# Patient Record
Sex: FEMALE | Race: WHITE
[De-identification: names, ages, dates, MRNs, and addresses within clinical notes are randomized per-mention and may not be internally consistent; named-entity substitution may affect disease eponyms.]

---

## 2017-09-28 ENCOUNTER — HOSPITAL ENCOUNTER (OUTPATIENT)
Dept: HOSPITAL 84 - D.MAMMO | Age: 47
End: 2017-09-28
Attending: FAMILY MEDICINE
Payer: MEDICAID

## 2017-09-28 DIAGNOSIS — N63: Primary | ICD-10-CM

## 2017-12-19 ENCOUNTER — HOSPITAL ENCOUNTER (EMERGENCY)
Dept: HOSPITAL 84 - D.ER | Age: 47
Discharge: HOME | End: 2017-12-19
Payer: MEDICAID

## 2017-12-19 DIAGNOSIS — K04.7: ICD-10-CM

## 2017-12-19 DIAGNOSIS — Z79.4: ICD-10-CM

## 2017-12-19 DIAGNOSIS — F17.200: ICD-10-CM

## 2017-12-19 DIAGNOSIS — K02.9: Primary | ICD-10-CM

## 2017-12-19 DIAGNOSIS — E11.9: ICD-10-CM

## 2018-03-28 ENCOUNTER — HOSPITAL ENCOUNTER (EMERGENCY)
Dept: HOSPITAL 84 - D.ER | Age: 48
Discharge: HOME | End: 2018-03-28
Payer: MEDICAID

## 2018-03-28 DIAGNOSIS — Z79.4: ICD-10-CM

## 2018-03-28 DIAGNOSIS — F17.200: ICD-10-CM

## 2018-03-28 DIAGNOSIS — H00.015: Primary | ICD-10-CM

## 2018-03-28 DIAGNOSIS — E11.9: ICD-10-CM

## 2018-03-28 DIAGNOSIS — K04.7: ICD-10-CM

## 2018-11-08 ENCOUNTER — HOSPITAL ENCOUNTER (EMERGENCY)
Dept: HOSPITAL 84 - D.ER | Age: 48
Discharge: HOME | End: 2018-11-08
Payer: SELF-PAY

## 2018-11-08 VITALS — SYSTOLIC BLOOD PRESSURE: 100 MMHG | DIASTOLIC BLOOD PRESSURE: 74 MMHG

## 2018-11-08 VITALS — BODY MASS INDEX: 18.52 KG/M2 | HEIGHT: 66 IN | WEIGHT: 115.24 LBS

## 2018-11-08 DIAGNOSIS — L03.111: ICD-10-CM

## 2018-11-08 DIAGNOSIS — L02.411: Primary | ICD-10-CM

## 2018-11-08 DIAGNOSIS — E11.9: ICD-10-CM

## 2018-11-08 DIAGNOSIS — K21.9: ICD-10-CM

## 2019-06-04 ENCOUNTER — HOSPITAL ENCOUNTER (INPATIENT)
Dept: HOSPITAL 84 - D.ER | Age: 49
LOS: 3 days | Discharge: HOME | DRG: 189 | End: 2019-06-07
Attending: INTERNAL MEDICINE | Admitting: INTERNAL MEDICINE
Payer: MEDICAID

## 2019-06-04 VITALS
BODY MASS INDEX: 18.04 KG/M2 | BODY MASS INDEX: 18.04 KG/M2 | BODY MASS INDEX: 18.04 KG/M2 | WEIGHT: 112.24 LBS | HEIGHT: 66 IN | WEIGHT: 112.24 LBS | HEIGHT: 66 IN

## 2019-06-04 VITALS — DIASTOLIC BLOOD PRESSURE: 63 MMHG | SYSTOLIC BLOOD PRESSURE: 103 MMHG

## 2019-06-04 VITALS — DIASTOLIC BLOOD PRESSURE: 76 MMHG | SYSTOLIC BLOOD PRESSURE: 97 MMHG

## 2019-06-04 DIAGNOSIS — J20.9: ICD-10-CM

## 2019-06-04 DIAGNOSIS — E87.1: ICD-10-CM

## 2019-06-04 DIAGNOSIS — J44.1: ICD-10-CM

## 2019-06-04 DIAGNOSIS — J44.0: ICD-10-CM

## 2019-06-04 DIAGNOSIS — J45.901: ICD-10-CM

## 2019-06-04 DIAGNOSIS — Z91.14: ICD-10-CM

## 2019-06-04 DIAGNOSIS — E11.65: ICD-10-CM

## 2019-06-04 DIAGNOSIS — K21.9: ICD-10-CM

## 2019-06-04 DIAGNOSIS — J96.01: Primary | ICD-10-CM

## 2019-06-04 DIAGNOSIS — F17.213: ICD-10-CM

## 2019-06-04 LAB
ALBUMIN SERPL-MCNC: 3.7 G/DL (ref 3.4–5)
ALP SERPL-CCNC: 57 U/L (ref 46–116)
ALT SERPL-CCNC: 32 U/L (ref 10–68)
AMYLASE SERPL-CCNC: 45 U/L (ref 25–115)
ANION GAP SERPL CALC-SCNC: 14.9 MMOL/L (ref 8–16)
APTT BLD: 23.7 SECONDS (ref 22.8–39.4)
BASOPHILS NFR BLD AUTO: 0.7 % (ref 0–2)
BILIRUB SERPL-MCNC: 0.68 MG/DL (ref 0.2–1.3)
BUN SERPL-MCNC: 9 MG/DL (ref 7–18)
CALCIUM SERPL-MCNC: 9.2 MG/DL (ref 8.5–10.1)
CHLORIDE SERPL-SCNC: 98 MMOL/L (ref 98–107)
CK MB SERPL-MCNC: 1.3 U/L (ref 0–3.6)
CK SERPL-CCNC: 41 UL (ref 21–215)
CO2 SERPL-SCNC: 25.2 MMOL/L (ref 21–32)
CREAT SERPL-MCNC: 0.9 MG/DL (ref 0.6–1.3)
EOSINOPHIL NFR BLD: 8.1 % (ref 0–7)
ERYTHROCYTE [DISTWIDTH] IN BLOOD BY AUTOMATED COUNT: 12.2 % (ref 11.5–14.5)
GLOBULIN SER-MCNC: 3.1 G/L
GLUCOSE SERPL-MCNC: 485 MG/DL (ref 74–106)
HCT VFR BLD CALC: 43.5 % (ref 36–48)
HGB BLD-MCNC: 16 G/DL (ref 12–16)
IMM GRANULOCYTES NFR BLD: 0.4 % (ref 0–5)
INR PPP: 0.93 (ref 0.85–1.17)
LIPASE SERPL-CCNC: 228 U/L (ref 73–393)
LYMPHOCYTES NFR BLD AUTO: 18.8 % (ref 15–50)
MCH RBC QN AUTO: 32.7 PG (ref 26–34)
MCHC RBC AUTO-ENTMCNC: 36.8 G/DL (ref 31–37)
MCV RBC: 89 FL (ref 80–100)
MONOCYTES NFR BLD: 5.7 % (ref 2–11)
NEUTROPHILS NFR BLD AUTO: 66.3 % (ref 40–80)
NT-PROBNP SERPL-MCNC: 44 PG/ML (ref 0–125)
OSMOLALITY SERPL CALC.SUM OF ELEC: 287 MOSM/KG (ref 275–300)
PLATELET # BLD: 233 10X3/UL (ref 130–400)
PMV BLD AUTO: 9.9 FL (ref 7.4–10.4)
POTASSIUM SERPL-SCNC: 4.1 MMOL/L (ref 3.5–5.1)
PROT SERPL-MCNC: 6.8 G/DL (ref 6.4–8.2)
PROTHROMBIN TIME: 11.9 SECONDS (ref 11.6–15)
RBC # BLD AUTO: 4.89 10X6/UL (ref 4–5.4)
SODIUM SERPL-SCNC: 134 MMOL/L (ref 136–145)
TROPONIN I SERPL-MCNC: < 0.017 NG/ML (ref 0–0.06)
WBC # BLD AUTO: 9.1 10X3/UL (ref 4.8–10.8)

## 2019-06-04 NOTE — NUR
PT WATCHING TV. RECEIVED MEDICATIONS PER MAR, TOLERATED WELL. CALL LIGHT IN
REACH. WILL CONTINUE TO OBSERVE.

## 2019-06-05 VITALS — SYSTOLIC BLOOD PRESSURE: 90 MMHG | DIASTOLIC BLOOD PRESSURE: 51 MMHG

## 2019-06-05 VITALS — SYSTOLIC BLOOD PRESSURE: 94 MMHG | DIASTOLIC BLOOD PRESSURE: 60 MMHG

## 2019-06-05 VITALS — DIASTOLIC BLOOD PRESSURE: 48 MMHG | SYSTOLIC BLOOD PRESSURE: 89 MMHG

## 2019-06-05 VITALS — SYSTOLIC BLOOD PRESSURE: 101 MMHG | DIASTOLIC BLOOD PRESSURE: 63 MMHG

## 2019-06-05 VITALS — SYSTOLIC BLOOD PRESSURE: 94 MMHG | DIASTOLIC BLOOD PRESSURE: 49 MMHG

## 2019-06-05 LAB
ANION GAP SERPL CALC-SCNC: 15.4 MMOL/L (ref 8–16)
APPEARANCE UR: CLEAR
BACTERIA #/AREA URNS HPF: (no result) /HPF
BASOPHILS NFR BLD AUTO: 0.1 % (ref 0–2)
BILIRUB SERPL-MCNC: NEGATIVE MG/DL
BUN SERPL-MCNC: 11 MG/DL (ref 7–18)
CALCIUM SERPL-MCNC: 8.9 MG/DL (ref 8.5–10.1)
CHLORIDE SERPL-SCNC: 103 MMOL/L (ref 98–107)
CO2 SERPL-SCNC: 24.5 MMOL/L (ref 21–32)
COLOR UR: (no result)
CREAT SERPL-MCNC: 0.9 MG/DL (ref 0.6–1.3)
EOSINOPHIL NFR BLD: 0 % (ref 0–7)
ERYTHROCYTE [DISTWIDTH] IN BLOOD BY AUTOMATED COUNT: 12.2 % (ref 11.5–14.5)
EST. AVERAGE GLUCOSE BLD GHB EST-MCNC: 301 MG/DL (ref 74–154)
GLUCOSE SERPL-MCNC: 1000 MG/DL
GLUCOSE SERPL-MCNC: 280 MG/DL (ref 74–106)
HCT VFR BLD CALC: 39.6 % (ref 36–48)
HGB BLD-MCNC: 14.6 G/DL (ref 12–16)
IMM GRANULOCYTES NFR BLD: 0.4 % (ref 0–5)
KETONES UR STRIP-MCNC: (no result) MG/DL
LYMPHOCYTES NFR BLD AUTO: 6.9 % (ref 15–50)
MCH RBC QN AUTO: 32.4 PG (ref 26–34)
MCHC RBC AUTO-ENTMCNC: 36.9 G/DL (ref 31–37)
MCV RBC: 88 FL (ref 80–100)
MONOCYTES NFR BLD: 4.4 % (ref 2–11)
NEUTROPHILS NFR BLD AUTO: 88.2 % (ref 40–80)
NITRITE UR-MCNC: NEGATIVE MG/ML
OSMOLALITY SERPL CALC.SUM OF ELEC: 286 MOSM/KG (ref 275–300)
PH UR STRIP: 5 [PH] (ref 5–6)
PLATELET # BLD: 228 10X3/UL (ref 130–400)
PMV BLD AUTO: 9.9 FL (ref 7.4–10.4)
POTASSIUM SERPL-SCNC: 3.9 MMOL/L (ref 3.5–5.1)
PROT UR-MCNC: NEGATIVE MG/DL
RBC # BLD AUTO: 4.5 10X6/UL (ref 4–5.4)
SODIUM SERPL-SCNC: 139 MMOL/L (ref 136–145)
SP GR UR STRIP: 1.01 (ref 1–1.02)
SQUAMOUS #/AREA URNS HPF: (no result) /HPF (ref 0–5)
UROBILINOGEN UR-MCNC: NORMAL MG/DL
WBC # BLD AUTO: 16.6 10X3/UL (ref 4.8–10.8)
WBC #/AREA URNS HPF: (no result) /HPF (ref 0–5)
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
## 2019-06-05 NOTE — MORECARE
CASE MANAGEMENT DISCHARGE SUMMARY
 
 
PATIENT: LEONID HERNANDEZ                    UNIT: [id number]
ACCOUNT#: [id number]                       ADM DATE: 19
AGE: 48     : 72  SEX: F            ROOM/BED: D.1213    
AUTHOR: FALLON REID                             PHYSICIAN:                               
 
REFERRING PHYSICIAN: MILANA JANG MD               
DATE OF SERVICE: 19
Discharge Plan
 
 
Patient Name: LEONID HERNANDEZ
Facility: [facility]:[place]
Encounter #: [id number]
Medical Record #: [id number]
: 1970
Planned Disposition: 
Anticipated Discharge Date: 
 
Discharge Date: 
Expected LOS: 
Initial Reviewer: [email or web address]
Initial Review Date: 2019
Generated: 19   1:34 pm 
  
 
 
 
 
 
 
 
Patient Name: LEONID HERNANDEZ
 
Encounter #: [id number]
Page [id number]
 
 
 
 
 
Electronically Signed by FALLON REID on 19 at 1234
 
 
 
 
 
 
**All edits/amendments must be made on the electronic document**
 
DICTATION DATE: 19 1233     : HARRY  19 1233     
RPT#: [id number]                                DC DATE:        
                                               STATUS: ADM IN  
[facility]
 [place] [address]
***END OF REPORT***

## 2019-06-05 NOTE — NUR
PT RESTING WITH EYES CLOSED AND CHEST RISING. NO S/S OF DISTRESS. CALL LIGHT
IN REACH. WILL CONTINUE TO OBSERVE.

## 2019-06-05 NOTE — MORECARE
CASE MANAGEMENT DISCHARGE SUMMARY
 
 
PATIENT: LEONID HERNANDEZ                    UNIT: [id number]
ACCOUNT#: [id number]                       ADM DATE: 19
AGE: 48     : 72  SEX: F            ROOM/BED: D.1213    
AUTHOR: FALLON REID                             PHYSICIAN:                               
 
REFERRING PHYSICIAN: MILANA JANG MD               
DATE OF SERVICE: 19
Discharge Plan
 
 
Patient Name: LEONID HERNANDEZ
[facility]
Encounter #: [id number]
Medical Record #: [id number]
: 1970
Planned Disposition: 
Anticipated Discharge Date: 
 
Discharge Date: 
Expected LOS: 
Initial Reviewer: [id number]
Initial Review Date: 2019
Generated: 19   1:40 pm 
 DCPIA - Discharge Planning Initial Assessment
 
Updated by [id number]: Tracy Knutson on 19  12:35 pm
*  Is the patient Alert and Oriented?
Yes
*  How many steps to enter\exit or inside your home? 3-4 *  PCP NAKIA *  Pharmacy [facility]
*  Preadmission Environment
Home with Family
*  ADLs
Independent
*  Equipment
None
*  List name and contact numbers for known caregivers / representatives who 
currently or will assist patient after discharge:
CHANA OR AIDAN BALDWIN [facility] [phone]
*  Verbal permission to speak to the caregivers and representatives has been 
obtained from the patient.
N/A
*  Community resources currently utilized
None
*  Additional services required to return to the preadmission environment?
No
*  Can the patient safely return to the preadmission environment?
 
Yes
*  Has this patient been hospitalized within the prior 30 days at any 
hospital?
No
 
 
 
 
 
 
 
Last DP export: 19  11:34 am
Patient Name: LEONID HERNANDEZ
Encounter #: [id number]
Page [id number]
 
 
 
 
 
Electronically Signed by FALLON REID on 19 at 1241
 
 
 
 
 
 
**All edits/amendments must be made on the electronic document**
 
DICTATION DATE: 19 124     : HARRY  19 1240     
RPT#: [id number]                                DC DATE:        
                                               STATUS: ADM IN  
[facility]
 [place] [address]
***END OF REPORT***

## 2019-06-06 VITALS — DIASTOLIC BLOOD PRESSURE: 55 MMHG | SYSTOLIC BLOOD PRESSURE: 85 MMHG

## 2019-06-06 VITALS — DIASTOLIC BLOOD PRESSURE: 48 MMHG | SYSTOLIC BLOOD PRESSURE: 90 MMHG

## 2019-06-06 VITALS — SYSTOLIC BLOOD PRESSURE: 112 MMHG | DIASTOLIC BLOOD PRESSURE: 63 MMHG

## 2019-06-06 VITALS — SYSTOLIC BLOOD PRESSURE: 106 MMHG | DIASTOLIC BLOOD PRESSURE: 68 MMHG

## 2019-06-06 VITALS — SYSTOLIC BLOOD PRESSURE: 118 MMHG | DIASTOLIC BLOOD PRESSURE: 60 MMHG

## 2019-06-06 VITALS — SYSTOLIC BLOOD PRESSURE: 110 MMHG | DIASTOLIC BLOOD PRESSURE: 65 MMHG

## 2019-06-06 LAB
ANION GAP SERPL CALC-SCNC: 15.7 MMOL/L (ref 8–16)
BASOPHILS NFR BLD AUTO: 0 % (ref 0–2)
BUN SERPL-MCNC: 17 MG/DL (ref 7–18)
CALCIUM SERPL-MCNC: 8.5 MG/DL (ref 8.5–10.1)
CHLORIDE SERPL-SCNC: 103 MMOL/L (ref 98–107)
CO2 SERPL-SCNC: 24.4 MMOL/L (ref 21–32)
CREAT SERPL-MCNC: 0.8 MG/DL (ref 0.6–1.3)
EOSINOPHIL NFR BLD: 0 % (ref 0–7)
ERYTHROCYTE [DISTWIDTH] IN BLOOD BY AUTOMATED COUNT: 12.7 % (ref 11.5–14.5)
GLUCOSE SERPL-MCNC: 403 MG/DL (ref 74–106)
HCT VFR BLD CALC: 38.6 % (ref 36–48)
HGB BLD-MCNC: 13.6 G/DL (ref 12–16)
IMM GRANULOCYTES NFR BLD: 0.8 % (ref 0–5)
LYMPHOCYTES NFR BLD AUTO: 6.8 % (ref 15–50)
MAGNESIUM SERPL-MCNC: 1.6 MG/DL (ref 1.8–2.4)
MCH RBC QN AUTO: 31.8 PG (ref 26–34)
MCHC RBC AUTO-ENTMCNC: 35.2 G/DL (ref 31–37)
MCV RBC: 90.2 FL (ref 80–100)
MONOCYTES NFR BLD: 1.6 % (ref 2–11)
NEUTROPHILS NFR BLD AUTO: 90.8 % (ref 40–80)
OSMOLALITY SERPL CALC.SUM OF ELEC: 296 MOSM/KG (ref 275–300)
PHOSPHATE SERPL-MCNC: 4.4 MG/DL (ref 2.5–4.9)
PLATELET # BLD: 196 10X3/UL (ref 130–400)
PMV BLD AUTO: 9.7 FL (ref 7.4–10.4)
POTASSIUM SERPL-SCNC: 4.1 MMOL/L (ref 3.5–5.1)
RBC # BLD AUTO: 4.28 10X6/UL (ref 4–5.4)
SODIUM SERPL-SCNC: 139 MMOL/L (ref 136–145)
WBC # BLD AUTO: 10.5 10X3/UL (ref 4.8–10.8)

## 2019-06-06 NOTE — NUR
ASSESSED AT THE BEGINNING OF THE SHIFT. PT IS ALERT AND ORIENTED, ABLE TO
VERBALIZE NEEDS. PT DID HER OWN BLOOD SUGAR AND REQUESTED A SANDWICH, OUR
MONITOR READ 228 BLOOD SUGAR AND SHE GAVE HER INSULIN AS SET UP. SHE IS DOING
HER ON INJECTIONS. ALL MEDS WERE GIVEN AND SHE IS GETTING UP TO THE BATHROOM
AD MICHELLE. WILL CONTINUE TO MONITOR.

## 2019-06-06 NOTE — NUR
INITIAL ROUNDING, PATIENT IS AWAKE, REPORTING A HEADACHE THAT HAS IMPROVED
SINCE THE TYLENOL. SHE IS COLD, ROOM TEMP ADJUSTED AT THIS TIME. CALL LEONEL IN
REACH

## 2019-06-07 VITALS — SYSTOLIC BLOOD PRESSURE: 101 MMHG | DIASTOLIC BLOOD PRESSURE: 50 MMHG

## 2019-06-07 VITALS — SYSTOLIC BLOOD PRESSURE: 111 MMHG | DIASTOLIC BLOOD PRESSURE: 77 MMHG

## 2019-06-07 VITALS — SYSTOLIC BLOOD PRESSURE: 112 MMHG | DIASTOLIC BLOOD PRESSURE: 69 MMHG

## 2019-06-07 LAB
ANION GAP SERPL CALC-SCNC: 14.8 MMOL/L (ref 8–16)
BASOPHILS NFR BLD AUTO: 0 % (ref 0–2)
BUN SERPL-MCNC: 24 MG/DL (ref 7–18)
CALCIUM SERPL-MCNC: 8.7 MG/DL (ref 8.5–10.1)
CHLORIDE SERPL-SCNC: 102 MMOL/L (ref 98–107)
CO2 SERPL-SCNC: 24.5 MMOL/L (ref 21–32)
CREAT SERPL-MCNC: 0.9 MG/DL (ref 0.6–1.3)
EOSINOPHIL NFR BLD: 0 % (ref 0–7)
ERYTHROCYTE [DISTWIDTH] IN BLOOD BY AUTOMATED COUNT: 12.7 % (ref 11.5–14.5)
GLUCOSE SERPL-MCNC: 323 MG/DL (ref 74–106)
HCT VFR BLD CALC: 38.8 % (ref 36–48)
HGB BLD-MCNC: 13.6 G/DL (ref 12–16)
IGA SERPL-MCNC: 153 MG/DL (ref 87–352)
IGG SERPL-MCNC: 694 MG/DL (ref 700–1600)
IMM GRANULOCYTES NFR BLD: 1.3 % (ref 0–5)
LYMPHOCYTES NFR BLD AUTO: 13.8 % (ref 15–50)
MAGNESIUM SERPL-MCNC: 1.9 MG/DL (ref 1.8–2.4)
MCH RBC QN AUTO: 31.8 PG (ref 26–34)
MCHC RBC AUTO-ENTMCNC: 35.1 G/DL (ref 31–37)
MCV RBC: 90.7 FL (ref 80–100)
MONOCYTES NFR BLD: 4.5 % (ref 2–11)
NEUTROPHILS NFR BLD AUTO: 80.4 % (ref 40–80)
OSMOLALITY SERPL CALC.SUM OF ELEC: 289 MOSM/KG (ref 275–300)
PHOSPHATE SERPL-MCNC: 3.9 MG/DL (ref 2.5–4.9)
PLATELET # BLD: 200 10X3/UL (ref 130–400)
PMV BLD AUTO: 9.9 FL (ref 7.4–10.4)
POTASSIUM SERPL-SCNC: 4.3 MMOL/L (ref 3.5–5.1)
RBC # BLD AUTO: 4.28 10X6/UL (ref 4–5.4)
SODIUM SERPL-SCNC: 137 MMOL/L (ref 136–145)
WBC # BLD AUTO: 11.2 10X3/UL (ref 4.8–10.8)

## 2019-06-07 NOTE — NUR
Nutrition Follow Up:
Chart reviewed
Diet: ADA
PO Intake: 100% meal avg
Wt stable
No BM since admit
Labs reviewed - Glucose continues elevated
Meds noted including Prednisone, Lantus, Humalog
Rec continue current diet.
RD following.
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
## 2019-06-07 NOTE — NUR
INITIAL ROUNDING ON THE PATIENT, SHE IS AWAKE AND WAITING ON BREAKFAST. SHE
DENIES PAIN, AND IS NO LONGER ON OXYGEN. UP AD MICHELLE, AND SHOWERED LAST NIGHT.
CALL LIGHT IN REACH

## 2019-09-08 ENCOUNTER — HOSPITAL ENCOUNTER (OUTPATIENT)
Dept: HOSPITAL 84 - D.ER | Age: 49
Setting detail: OBSERVATION
LOS: 1 days | Discharge: HOME | End: 2019-09-09
Attending: INTERNAL MEDICINE | Admitting: INTERNAL MEDICINE
Payer: MEDICAID

## 2019-09-08 VITALS
BODY MASS INDEX: 19.65 KG/M2 | HEIGHT: 66 IN | BODY MASS INDEX: 19.65 KG/M2 | WEIGHT: 122.26 LBS | BODY MASS INDEX: 19.65 KG/M2 | WEIGHT: 122.26 LBS | HEIGHT: 66 IN

## 2019-09-08 VITALS — SYSTOLIC BLOOD PRESSURE: 126 MMHG | DIASTOLIC BLOOD PRESSURE: 65 MMHG

## 2019-09-08 DIAGNOSIS — E11.9: ICD-10-CM

## 2019-09-08 DIAGNOSIS — E87.6: ICD-10-CM

## 2019-09-08 DIAGNOSIS — I10: ICD-10-CM

## 2019-09-08 DIAGNOSIS — F17.213: ICD-10-CM

## 2019-09-08 DIAGNOSIS — I25.110: Primary | ICD-10-CM

## 2019-09-08 DIAGNOSIS — K21.9: ICD-10-CM

## 2019-09-08 DIAGNOSIS — J45.909: ICD-10-CM

## 2019-09-08 LAB
ALBUMIN SERPL-MCNC: 3.2 G/DL (ref 3.4–5)
ALP SERPL-CCNC: 49 U/L (ref 46–116)
ALT SERPL-CCNC: 20 U/L (ref 10–68)
ANION GAP SERPL CALC-SCNC: 11.7 MMOL/L (ref 8–16)
APTT BLD: 24.6 SECONDS (ref 22.8–39.4)
BASOPHILS NFR BLD AUTO: 0.7 % (ref 0–2)
BILIRUB SERPL-MCNC: 0.59 MG/DL (ref 0.2–1.3)
BUN SERPL-MCNC: 14 MG/DL (ref 7–18)
CALCIUM SERPL-MCNC: 8.3 MG/DL (ref 8.5–10.1)
CHLORIDE SERPL-SCNC: 102 MMOL/L (ref 98–107)
CK MB SERPL-MCNC: 0.5 U/L (ref 0–3.6)
CK SERPL-CCNC: 37 UL (ref 21–215)
CO2 SERPL-SCNC: 29.6 MMOL/L (ref 21–32)
CREAT SERPL-MCNC: 1 MG/DL (ref 0.6–1.3)
EOSINOPHIL NFR BLD: 4.8 % (ref 0–7)
ERYTHROCYTE [DISTWIDTH] IN BLOOD BY AUTOMATED COUNT: 11.8 % (ref 11.5–14.5)
GLOBULIN SER-MCNC: 2.9 G/L
GLUCOSE SERPL-MCNC: 340 MG/DL (ref 74–106)
HCT VFR BLD CALC: 37.9 % (ref 36–48)
HGB BLD-MCNC: 14.1 G/DL (ref 12–16)
IMM GRANULOCYTES NFR BLD: 0.1 % (ref 0–5)
INR PPP: 0.86 (ref 0.85–1.17)
LYMPHOCYTES NFR BLD AUTO: 38.4 % (ref 15–50)
MAGNESIUM SERPL-MCNC: 1.8 MG/DL (ref 1.8–2.4)
MCH RBC QN AUTO: 32.7 PG (ref 26–34)
MCHC RBC AUTO-ENTMCNC: 37.2 G/DL (ref 31–37)
MCV RBC: 87.9 FL (ref 80–100)
MONOCYTES NFR BLD: 5 % (ref 2–11)
NEUTROPHILS NFR BLD AUTO: 51 % (ref 40–80)
OSMOLALITY SERPL CALC.SUM OF ELEC: 292 MOSM/KG (ref 275–300)
PLATELET # BLD: 214 10X3/UL (ref 130–400)
PMV BLD AUTO: 9.7 FL (ref 7.4–10.4)
POTASSIUM SERPL-SCNC: 3.3 MMOL/L (ref 3.5–5.1)
PROT SERPL-MCNC: 6.1 G/DL (ref 6.4–8.2)
PROTHROMBIN TIME: 11.2 SECONDS (ref 11.6–15)
RBC # BLD AUTO: 4.31 10X6/UL (ref 4–5.4)
SODIUM SERPL-SCNC: 140 MMOL/L (ref 136–145)
TROPONIN I SERPL-MCNC: < 0.017 NG/ML (ref 0–0.06)
WBC # BLD AUTO: 7.4 10X3/UL (ref 4.8–10.8)

## 2019-09-08 NOTE — HEMODYNAMI
PATIENT:LEONID HERNANDEZ                                 MEDICAL RECORD: [id number]
: 72                                            LOCATION:[facility]     
ACCT# [id number]                                       ADMISSION DATE: 19
 
 
 Generatedon:201916:21
Patient name: LEONID HERNANDEZ Patient #: [id number] Visit #: [id number] SSN: [id number]
[id number] : 1970
Date of study: 2019
Page: Of
Hemodynamic Procedure Report
****************************
Patient Data
Patient Demographics
Procedure consent was obtained
First Name: LEONID          Gender: Female
Last Name: MARY          : 1970
Middle Initial: D           Age: 49 year(s)
Patient #: [id number]       Race: 
Visit #: [id number]
SSN: [id number]
Accession #:
[id number]
Additional ID: [id number]
Contact details
Address: [address]     Phone: [phone]
[employer]
State: [place]
City: [place]
Zip code: [address]
Past Medical History
Allergies
Allergen        Reaction        Date         Comments
Reported
Morphine                        2019
Admission
Admission Data
Admission Date: 2019    Admission Time: 0:02
Arrival Date: 2019      Arrival Time: 0:00
Room #: D.2130              Insurance Payor: Private
health insurance
[facility] #: [id number]
Height (in.): 65.75         BSA: 1.61 (m2)
Height (cm.): 167           BMI: 19.72 (kg/m2)
Weight (lbs.): 121.25
Weight (kg.): 55
Lab Results
Lab Result Date: 2019   Lab Result Time: 0:00
Biochemistry
Name         Units    Result                Min      Max
BUN          mg/dl    14       --(--*-)--   7        18
Creatinine   mg/dl    1        --(--*-)--   0.6      1.3
CBC
Name         Units    Result                Min      Max
Hematocrit   %        37.9     *-(----)--   42       54
Hemoglobin   g/dl     14.1     --(*---)--   13.5     17.5
Procedure
Procedure Types
Cath Procedure
 
Diagnostic Procedure
[facility] w/Coronaries
PCI Procedure
Coronary Stent
Coronary Stent Initial
Procedure Description
Procedure Date
Procedure Date: 2019
Procedure Start Time: 16:06
Procedure End Time: 16:21
Procedure Staff
Name                            Function
Jhoan Patel MD                Performing Physician
Jesika Negrete RT               Monitor
Marizol Galan RT              Monitor
Lulu Abdullahi RT                    Scrub
Isadora Ro RN                Nurse
Procedure Data
Cath Procedure
Fluoroscopy
Diagnostic fluoroscopy      Total fluoroscopy Time: 2.4
time: 2.4 min               min
Diagnostic fluoroscopy      Total fluoroscopy dose: 265
dose: 265 mGy               mGy
Contrast Material
Contrast Material Type                       Amount (ml)
Isovue 300                                   65
Entry Location
Entry     Primary  Successful  Side  Size  Upsize Upsize Entry    Closure     Gutierrez
ccessful  Closure
Location                             (Fr)  1 (Fr) 2 (Fr) Remarks  Device        
          Remarks
Radial                         Right 6 Fr                         Mechanical
artery                               Short                        Compression
Estimated blood loss: 10 ml
Diagnostic catheters
Device Type               Used For           End Catheter
Placement
DIAGNOSTIC [place] 110cm 5  Procedure
Fr catheter ([id number])
Procedure Complications
No complications
Procedure Medications
Medication           Administration Route Dosage
0.9% NaCl            I.V.                 100 ml/hr
Oxygen               etCO2 Nasal cannula  2 l/min
Lidocaine 2%         added to field       20
Heparin Flush Bag    added to field       2 bags
(1000units/500ml NS)
Radial Cocktail      added to field       1 syringe
(Verapamil 2mg/Nitro
400mcg/Heparin
1500units)
Benadryl             I.V.                 25 mg
Versed               I.V.                 2 mg
Fentanyl             I.V.                 100 mcg
Heparin Bolus        I.V.                 4000 units
Integrilin (Bolus    I.V.                 5 ml
2mg/ml)
 
Integrilin (Bolus    wasted               5 ml
2mg/ml)
Plavix               P.O.                 600 mg
Hemodynamics
Rest
BSA: 1.61 (m2) HGB: 14.1 (g/dl) O2 Consumption: Estimated: 156.92 (ml/min) O2 Co
nsumption indexed:
Estimated:97.47 (ml/min/m) Heart Rate: 67 (bpm)
Snapshots
Pre Cath      Intra         NCS           Post Cath
Vital Signs
Time     Heart  Resp   SPO2 etCO2   NIBP       Rhythm  Pain    Sedation
Rate   (ipm)  (%)  (mmHg)  (mmHg)             Status  Level
(bpm)
15:52:17 71     19     98   31      No Cuff    NSR     0 (11)  10(A)
, No
pain
15:54:34 74     15     97   28.5    112/42(93) NSR     0 (11)  10(A)
, No
pain
15:58:42 79     14     100  33.7    113/79(96) NSR     0 (11)  10(A)
, No
pain
16:02:50 77     14     98   32.2    107/78(88) NSR     0 (11)  10(A)
, No
pain
16:06:58 75     10     99   40.4    103/76(95) NSR     0 (11)  10(A)
, No
pain
16:11:04 88     10     100  36.7    105/74(81) NSR     0 (11)  10(A)
, No
pain
16:15:10 87     17     98   21.7    93/68(77)  NSR     0 (11)  10(A)
, No
pain
16:19:13 73     13     96   1.4     99/68(82)  NSR     0 (11)  10(A)
, No
pain
Medications
Time     Medication       Route   Dose    Verified Delivered Reason          Not
es    Effectiveness
by       by
16:04:15 0.9% NaCl        I.V.    100     Jhoan  Isadora     used for
ml/hr   Amanda Ro   procedure
RN
16:04:22 Oxygen           etCO2   2 l/min Jhoan  Isadora     used for
Nasal           Amanda Ro   procedure
cannula                  RN
16:04:28 Lidocaine 2%     added   20ml    Jhoan  Jhoan   for local
to      vial    Amanda Patel MD  anesthetic
field
16:04:34 Heparin Flush    added   2 bags  Jhoan  Jhoan   used for
Bag              to              Amanda Patel MD  procedure
(1000units/500ml field
NS)
16:04:39 Radial Cocktail  added   1       Jhoan  Johan   used for
(Verapamil       to      syringe Amanda Patel MD  procedure
2mg/Nitro        field
400mcg/Heparin
1500units)
 
16:05:08 Benadryl         I.V.    25 mg   Jhoan  Isadora     used for
Amanda Ro   procedure
RN
16:05:14 Versed           I.V.    2 mg    Jhoan  Isadora     for sedation
Amanda Ro
RN
16:05:19 Fentanyl         I.V.    100 mcg Jhoan  Isadora     for sedation
Amanda Ro
RN
16:12:43 Heparin Bolus    I.V.    4000    Jhoan  Isadora     for             lias
ified
units   Amanda Ro   anticoagulation with Dr. JEANA Foster
16:12:59 Integrilin       I.V.    5 ml    Jhoan  Isadora     for
(Bolus 2mg/ml)                   Amanda Ro   antiplatelet
RN        therapy
16:13:16 Integrilin       wasted  5 ml    Jhoan  Isadora     for
(Bolus 2mg/ml)                   Amanda Ro   antiplatelet
RN        therapy
16:13:18 Plavix           P.O.    600 mg  Jhoan Muir     for
Amanda Ro   antiplatelet
RN        therapy
Procedure Log
Time     Note
15:19:58 Isadora Ro RN sent for patient. Start room use.
15:20:15 Procedure Status Urgent Heart Cath (IP).
15::19 Time tracking: Regular hours (M-F 7:00 - 5:00)
15::29 Plan of Care:Hemodynamics will remain stable., Cardiac rhythm will
remain stable., Comfort level will be maintained., Respiratory function
will remain adequate., Patient/ family verbilizes understanding of
procedure., Procedure tolerated without complication., Recovers from
procedure without complications..
15:24:02 Lab Result : Hemoglobin 14.1 g/dl
15:24: Lab Result : Creatinine 1 mg/dl
15:24: Lab Result : BUN 14 mg/dl
15:24: Lab Result : Hematocrit 37.9 %
15:24:20 Patient Height : 65.75 inches
15:24:24 Patient Weight : 121.25 lbs
15::29 Arrival Date: 2019 12:00:00 AM
15:25:34 Insurance Payor : Private health insurance
15:26:37 Informed consent obtained and on chart
15:30:49 Patient allergic to Morphine
15:50:36 Patient received from Med II to CCL 2 Alert and oriented. Tansferred to
table in Supine position.
15:50:39 Warm blankets applied, and mariela hugger turned on for patient comfort.
15:50:40 Correct patient and procedure confirmed by team.
15:50:41 ECG and BP/O2 sat monitors applied to patient.
15:50:45 Vital chart was started
15:50:55 Baseline sample Acquired.
15:51:02 Rhythm: sinus rhythm
15:51:05 Full Disclosure recording started
15:51:07 -----------------------------------------------------------------------
-
15:51:42 Pre-procedure instructions explained to patient.
15:51:43 Pre-op teaching completed and patient verbalized understanding.
15:51:48 Family unavailable.
15:51:53 Patient NPO since Midnight.
15:52:02 Is the patient allergic to Iodine/contrast media? No.
15:52:06 Is patient on blood thinner?No
15:52:12 Patient diabetic? Yes.
 
15:52:17 If diabetic: On Metformin? No
15:52:23 Patient not pregnant. Patient has had hysterectomy.
15:52:29 Snore? Yes
15:52:52 Airway obstruction? Yes copd/asthma
15:53:00 Sleep apnea? No
15:53:03 Deviated septum? No
15:53:06 Opens mouth fully? Yes
15:53:09 Sticks out tongue? Yes
15:53:24 Dentures? No ?
15:53:35 Previous problem with sedation/anesthesia? No ?
15:53:41 ----Pre-sedation anethsthesia assessment.----
15:55:12 Patient pain scale 0/10 ?.
15:55:19 IV patent on arrival in left hand with 0.9% NaCl at O.
15:55:21 Lab results completed and on chart.
15:55:23 Right Radial & Right Groin area was prepped with chlora-prep and draped
in sterile fashion
15:55:24 Alarms reviewed by R. N.
15:55:24 Sharps counted by scrub and verified by R.N.
15:57:56 Physician arrived
15:57:57 --------ALL STOP TIME OUT------
15:57:58 Final Timeout: patient, procedure, and site verified with staff and
physician. All members of the team are in agreement.
15:58:03 Right Radial & Right Groin site verified by team.
15:58:10 Fire Safety Assessment: A--An alcohol-based skin anteseptic being used
preoperatively., C--Open oxygen or nitrous oxide is being used., D--An
ESU, laser, or fiber-optic light is being used.
15:58:16 Physical assessment completed. ASA score P 2 - A patient with mild
systemic disease as per Jhoan Patel MD.
15:58:22 2) 60-89 Mildly reduced kidney function, and other findings (as for
stage 1) point to kidney disease.
15:58:30 Maximum allowable contrast dose (3.7 X eGFR X 0.75)175 ml.
15:58:36 Sedation plan: IV Moderate Sedation Medication:Versed, Fentanyl
15:59:02 Use device set Radial Dx or PCI
15:59:06 ACIST Syringe (54225) opened to sterile field.
15:59:08 Medline Cath Pack ([id number]) opened to sterile field.
15:59:08 Bag Decanter (2002S) opened to sterile field.
15:59:09 ACIST Hand Control (98590) opened to sterile field.
15:59:10 ACIST Manifold (39844) opened to sterile field.
15:59:11 Tegaderm 4 x 4 (1626W) opened to sterile field.
15:59:13 MBrace Wrist Support (702106016) opened to sterile field.
15:59:16 EMERALD Guide Wire (011-526) opened to sterile field.
15:59:19 SHEATH 6FR RAIN (9821255) opened to sterile field.
16:01:50 Zero performed for pressure channel P1
16:04:15 0.9% NaCl 100 ml/hr I.V. was administered by Isadora Ro RN; used for
procedure;
16:04:22 Oxygen 2 l/min etCO2 Nasal cannula was administered by Isadora Ro RN
;
used for procedure;
16:04:28 Lidocaine 2% 20ml vial added to field was administered by Jhoan Patel MD; for local anesthetic;
16:04:34 Heparin Flush Bag (1000units/500ml NS) 2 bags added to field was
administered by Jhoan Patel MD; used for procedure;
16:04:39 Radial Cocktail (Verapamil 2mg/Nitro 400mcg/Heparin 1500units) 1 syring
e
added to field was administered by Jhoan Patel MD; used for procedure;
16:05:08 Benadryl 25 mg I.V. was administered by Isadora Ro RN; used for
procedure;
16:05:14 Versed 2 mg I.V. was administered by Isadora Ro RN; for sedation;
16:05:19 Fentanyl 100 mcg I.V. was administered by Isadora Ro RN; for
sedation;
 
16:06:20 Procedure started.
16:06:32 Local anesthetic to right radial artery with Lidocaine 2% by Jhoan Patel MD.**INITIAL ACCESS ONLY**
16:07:38 A 6 Fr Short sheath was inserted into the Right Radial artery
16:08:10 A DIAGNOSTIC Tiger 110cm 5 Fr catheter (317489) was advanced over the
wire and used for Procedure.
16:09:08 LV gram done using MONSON
16:09:19 Injector settings: Ml/sec: 5, Volume: 15,
16:09:31 EF : 55 %
16:09:36 LCA angiography performed.
16:10:25 RCA angiography performed.
16:10:32 Catheter exchanged over wire.
16:11:14 INFLATOR Merit BasixCompak (KN1052) opened to sterile field.
16:11:23 CHOICE PT Extra Support 182cm wire ([id number]) opened to sterile field.
16:12:42 GUIDE 6FR XBLAD 3.5 catheter ([id number]) opened to sterile field.
16:12:43 Heparin Bolus 4000 units I.V. was administered by Isadora Ro RN; for
anticoagulation; verified with Dr. Patel
16:12:57 6 Fr XBLAD 3.5 guide catheter was inserted over the wire
16:12:59 Integrilin (Bolus 2mg/ml) 5 ml I.V. was administered by Isadora Ro
RN; for antiplatelet therapy;
16:13:09 CHOICE  wire advanced.
16:13:12 Pre PCI Site: Native mCirc has 85% stenosis.
16:13:13 Wire advanced across lesion.
16:13:16 Integrilin (Bolus 2mg/ml) 5 ml wasted was administered by Isadora Ro
RN; for antiplatelet therapy;
16:13:18 Plavix 600 mg P.O. was administered by Isadora Ro RN; for
antiplatelet therapy;
16:14:38 Place stent Inflation Number: 1 A COBRA RX 3.0 X 18 Stent was prepped
and advanced across the Mid CX . The stent was deployed at 13 MYRANDA for
0:00 (min:sec) .
16:14:50 Stent catheter was removed intact over wire.
16:14:51 Wire removed.
16:14:52 Guide catheter removed.
16:15:03 Procedure ended.(Physican Out)
16:16:20 ZEPHYR REGULAR TR BAND (657695) opened to sterile field.
16:16:54 Sheath removed intact; hemostasis achieved with Mechanical Compression
to the Right Radial artery.
16:17:05 Fluoroscopy time 02.40 minutes.
16:17:12 Fluoroscopy dose: 265 mGy
16:17:12 Flurop Dose total: 265
16:17:27 Dose Area Product 26935 mGy/cm.
16:17:36 Contrast amount:Isovue 300 65ml.
16:17:40 Maximum allowable dose exceeded? No.
16:17:49 [place] band inflated with 11cc of air.
16:17:53 Insertion/operative site no bleeding no hematoma.
16:18:10 Post-procedure physical assessment completed. ASA score P 2 - A patient
with mild systemic disease as per Jhoan Patel MD.
16:18:16 Post procedure rhythm: unchanged.
16:18:23 Estimated blood loss: 10 ml
16:18:26 Post procedure instruction explained to patient.Patient verbalizes
understanding.
16:18:27 Patient needs reinforcement of post procedure teaching.
16:19:38 Procedure type changed to Cath procedure, Diagnostic procedure, LHC, LH
C
w/Coronaries, PCI procedure, Coronary Stent, Coronary Stent Initial
16:20:20 Procedure and supply charges have been captured, reviewed, submitted an
d
are correct.
16:20:30 Procedure Complication : No complications
16:20:34 Vital chart was stopped
 
16:20:36 See physician's report for complete and final results.
16:20:40 Report given to PCU.
16:20:59 Patient transfered to PCU with Bed.
16:21:02 Procedure ended.
16:21:02 Full Disclosure recording stopped
16:21:08 End room use (Document Last)
Intervention Summary
Intervention Notes
Time     ActionType  Lesion and  Equipment Action#  Pressure  Duration
Attributes  Used
16:14:38 Place stent Mid CX      COBRA RX  1        13        00:00
3.0 X 18
Stent
Device Usage
Item Name      Manufacture  Quantity  Catalog Number Hospital Part       Current
  Minimal Lot# /
Charge   Number     Stock    Stock   Serial#
Code
ACIST Syringe  Acist        1         67402          500211   879840     138901 
  20
(24578)        Medical
Systems Inc
Medline Cath   Medline      1         [id number]      390834   09008      389888 
  5
Pack
([id number])
Bag Decanter   Microtek     1                   623649   15543      613320 
  5
()        Medical Inc.
ACIST Hand     Acist        1         73182          047525   829578     611867 
  5
Control        Medical
(14686)        Systems Inc
ACIST Manifold Acist        1         19513          152182   363758     903315 
  5
(39988)        Medical
Systems Inc
Tegaderm 4 x 4 3M           1         1626W          583228   920853     739074 
  5
(1626W)
MBrace Wrist   Advanced     1         [id number]    917561   77591      751681 
  5
Support        Vascular
(315174156)    Dynamics
EMERALD Guide  Cardinal     1         502-455        983765   161555     746368 
  5
Wire (502455) Health
SHEATH 6FR     Cardinal     1         9389675        817126   4605967    871965 
  5
RAIN (9839637) Health
DIAGNOSTIC     Terumo       1                 497012   983992     844067 
  5
Tiger 110cm 5
Fr catheter
(010778)
INFLATOR Merit Merit        1         PG0827         131976   876768     920604 
  15
[employer]    Medical
(MP6455)
CHOICE PT      [place]       1         [id number]  776440   249936     690818 
 
  5
Extra Support  Scientific
182cm wire
(6259183C0)
GUIDE 6FR      Cardinal     1         07010092       389441   314984     601912 
  10
XBLAD 3.5      Health
catheter
(78056551)
COBRA RX 3.0 X Celonova     1         [phone]   063102   006533918  6017849
9 1       [id number]
18 stent       B[employer]iences
([id number])
ZEPHYR REGULAR Cardinal     1         660617         532547   4584439    626063 
  5
TR BAND        Ne[employer]ix
(904889)
Signature Audit [place]
Stage           Time        Signature      Unsigned
Intra-Procedure 2019    Marizol
4:21:32 PM  Adama
RT(R) (CV)
Signatures
Performing Physician :  Signature :
Jhoan Patel MD        ______________________________
Date : ______________ Time :
______________
Monitor : Jesika Negrete Signature :
RT                       ______________________________
Date : ______________ Time :
______________
Monitor : Marizol         Signature :
Adama RT            ______________________________
Date : ______________ Time :
______________
Nurse : Isadora Ro RN Signature :
______________________________
Date : ______________ Time :
______________
 
 
 
 
 
 
 
 
 
 
 
 
 
 
 
 
[address] [place] [address]

## 2019-09-09 VITALS — SYSTOLIC BLOOD PRESSURE: 97 MMHG | DIASTOLIC BLOOD PRESSURE: 60 MMHG

## 2019-09-09 VITALS — DIASTOLIC BLOOD PRESSURE: 63 MMHG | SYSTOLIC BLOOD PRESSURE: 105 MMHG

## 2019-09-09 VITALS — SYSTOLIC BLOOD PRESSURE: 102 MMHG | DIASTOLIC BLOOD PRESSURE: 66 MMHG

## 2019-09-09 VITALS — DIASTOLIC BLOOD PRESSURE: 51 MMHG | SYSTOLIC BLOOD PRESSURE: 96 MMHG

## 2019-09-09 VITALS — DIASTOLIC BLOOD PRESSURE: 60 MMHG | SYSTOLIC BLOOD PRESSURE: 105 MMHG

## 2019-09-09 VITALS — SYSTOLIC BLOOD PRESSURE: 101 MMHG | DIASTOLIC BLOOD PRESSURE: 60 MMHG

## 2019-09-09 VITALS — DIASTOLIC BLOOD PRESSURE: 58 MMHG | SYSTOLIC BLOOD PRESSURE: 100 MMHG

## 2019-09-09 LAB — EST. AVERAGE GLUCOSE BLD GHB EST-MCNC: 189 MG/DL (ref 74–154)

## 2019-09-09 NOTE — NUR
RETURNED TO ROOM VIA BED.  TR BAND ON AND INFLATED WITH NO BLEEDING NOTED AT
SITE.  PULSES PALP IN RIGHT WRIST.  NAILBEDS GAVI WELL.  C/O NAUSEA AND
MEDICATED WITH ZOFRAN AS ORDERED.  PT INSTRUCTED ABOUT BEDREST
FOR 4 HOURS AND VERBALIZES UNDERSTANDING.  IV PATENT TO LEFT HAND WITHOUT
REDNESS OR EDEMA.

## 2019-09-09 NOTE — NUR
WANTING TO GET UP TO BATHROOM AND REMINDED HER OF ORDERS FOR BEDREST.  STATES
SHE NEEDS TO HAVE DIARRHEA STOOL AND REFUSES TO USE BEDPAN.  TR BAND IN PLACE
WITH SOME SWELLING AND C/O PAIN ABOVE THE BAND.  REMOVED VERY SMALL AMT OF AIR
WITH NO BLEEDING NOTED. NAILBEDS GAVI WELL AND PULSES PALPABLE.

## 2019-09-09 NOTE — NUR
ADMISSION ASSESSMENT, HISTORY AND HOME MED LIST COMPLETED.  NORCO 5/325 PO
GIVEN FOR C/O L ARM PAIN.  IV TO L HAND SL.  LUNGS CTA.  SR PER CM HR 78.
VSS. ALERT AND ORIENTED TO PERSON, PLACE AND TIME.  [place].  SR UP X2, CALL [facility]
WITHIN REACH.

## 2019-09-09 NOTE — NUR
PT DOING WELL DENIES PAIN EDEMA DOWN IN RIGHT ARMA AND PULSES ARE INTACT WITH
NO BLEEDING FROM DRSG DCED IV WITH CATH INTACT AND DCED VIA WC TO PRIVATE AUTO

## 2019-09-09 NOTE — NUR
EXAMINED PT PT CO PAIN ALL OVER PT IS KICKING AND MOVING AROUND IN BED   EXAM
OF RT WRIST EDEMA  ABOVE SIGHT I FINISHED REMOVING FEM STOP DEVICE AND APLIED
PRESSURE DRSG LCTA AND SKIN WARM AND DRY

## 2019-09-10 NOTE — MORECARE
CASE MANAGEMENT DISCHARGE SUMMARY
 
 
PATIENT: LEONID HERNANDEZ                    UNIT: [id number]
ACCOUNT#: [id number]                       ADM DATE: 19
AGE: 49     : [birth year]  SEX: F            ROOM/BED: D.2130    
AUTHOR: FALLON REID                             PHYSICIAN:                               
 
REFERRING PHYSICIAN: MILANA JANG MD               
DATE OF SERVICE: 09/10/19
Discharge Plan
 
 
Patient Name: LEONID HERNANDEZ
Facility: [facility]
Encounter #: [id number]
Medical Record #: [id number]
: 1970
Planned Disposition: Home
Anticipated Discharge Date: 19
 
Discharge Date: 2019
Expected LOS: 1
Initial Reviewer: [id number]
Initial Review Date: 09/10/2019
Generated: 9/10/19   9:01 am 
  
 
 
 
 
 
 
 
Patient Name: LEONID HERNANDEZ
 
Encounter #: [id number]
Page [id number]
 
 
 
 
 
Electronically Signed by FALLON REID on 09/10/19 at 0801
 
 
 
 
 
 
**All edits/amendments must be made on the electronic document**
 
DICTATION DATE: 09/10/19 08     : HARRY  09/10/19 08     
RPT#: [id number]                                DC DATE:19
                                               STATUS: DIS IN  
[facility]
[address] [place] [address]
***END OF REPORT***

## 2019-09-11 NOTE — PN
PATIENT:LEONID BUSTAMANTE                          MEDICAL RECORD: [id number]
                                                         LOCATION:[address]
                                                         ADMISSION DATE: 09/09/19
 
PROGRESS NOTE
 
 
DATE OF SERVICE:  09/09/2019
 
DIAGNOSES:
1.  Unstable angina.
2.  Shortness of breath.
3.  Smoking history.
4.  Family history of coronary artery disease.
5.  Insulin-dependent diabetes.
 
SUBJECTIVE:  Mrs. Bustamante presents with arm pain, jaw pain and chest pain,
underwent stress testing which was abnormal with ST-T changes in stage I,
suggestive of hemodynamically significant disease.  I have discussed the stress
test results with her.  She has continued to have the arm pain and jaw pain. 
She is even having it now while we were discussing the results.  With continued
angina, multiple risk factors and abnormal stress test, we will proceed with
coronary angiography.  Further care depends upon the findings of the
angiography.
 
TRANSINT:[id number] Voice Confirmation ID: [id number] DOCUMENT ID: [id number]
 
 
 
 
                                           
                                           CLARISSE ESPINOSA MD             
 
 
 
Electronically Signed by CLARISSE ESPINOSA on 09/11/19 at 1432
 
 
 
 
 
 
 
 
 
 
 
 
 
 
 
CC:                                                             [id number]
DICTATION DATE: 09/09/19 1327     :     09/09/19 1402      DIS IN  
                                                                      09/09/19
[place] [id number] [place] [phone]

## 2019-09-11 NOTE — EC
PATIENT:LEONID HERNANDEZ                DATE OF SERVICE: 09/09/19
SEX: F                                  MEDICAL RECORD: [id number]
DATE OF BIRTH: [birth year]                        LOCATION:D.      D.213
AGE OF PATIENT: 49                             ADMISSION DATE: 09/09/19
 
REFERRING PHYSICIAN:                               
 
INTERPRETING PHYSICIAN: CLARISSE PATEL MD             
 
 
 
                             ECHOCARDIOGRAM REPORT
  ECHO CHARGES 4               ECHO COMPLETE                 Date: 09/09/19
 
 
 
CLINICAL DIAGNOSIS: HYPERTENSION                  
 
                         ECHOCARDIOGRAPHIC MEASUREMENTS
      (adult normal given)
   AC root (d.<3.7cm) 3.1  cm   LV Septum d (<1.2 cm> 0.6  cm
      Valve Excursion 1.7  cm     LV Septum (systole) 0.7  cm
Left Atria (s.<4.0cm> 2.9  cm          LVPW d(<1.2cm) 0.9  cm
        RV (d.<2.3cm) 2.9  cm           LVPW (sytole) 1.3  cm
  LV diastole(<5.6CM) 4.9  cm       MV E-F(>70mm/sec)      cm
           LV systole 3.7  cm           LVOT Diameter 1.8  cm
       MV exc.(>10mm)      cm
Est.ejection fraction (50-75%)     %
 
   DOPPLER:
     LVIT      cm/sec A 54   cm/sec E 70    cm/sec
       LA      cm/sec      RVSP 18.8 mmHg
     LVOT 72   cm/sec   AOP1/2T      m/s
  Asc. Ao 113  cm/sec
     RVOT 52   cm/sec
       RA      cm/sec
       PA 80   cm/sec
 AV Gradient Peak 5.1  mmHg  AV Mean 3.5  mmHg  AV Area 1.4  cm
 MV Gradient Peak 3.6  mmHg  MV Mean 2.1  mmHg  MV Area      cm
   COMMENTS:                                              
 
 
 Cardiac Sonographer: Estelle               PUNEET DOWNS             
      Cardiologist: 1          Dr. Patel                
             TAPE# [id number]           
                                       Pericardial Effusion N                        
 
 
DATE OF SERVICE:  
 
FINDINGS:
1.  Left ventricular chamber size is within normal limits.  Left ventricular
systolic function is normal at 55%.
2.  Left atrium, right atrium, and right ventricular chamber sizes are within
normal limits.
3.  Valvular structures have normal structure and motion.
4.  Doppler interrogation reveals no significant valvular insufficiency or
stenosis and pulmonary systolic pressure is normal estimated at 18 mmHg.
 
 
 
ECHOCARDIOGRAM REPORT                          [id number]    LEONID HERNANDEZ        
 
 
5.  No evidence of pericardial effusion or left ventricular thrombus.
 
TRANSINT:[id number] Voice Confirmation ID: [id number] DOCUMENT ID: [id number]
                                           
                                           CLARISSE PATEL MD             
 
 
 
Electronically Signed by CLARISSE PATEL on 09/11/19 at 1432
 
 
 
 
 
 
 
 
 
 
 
 
 
 
 
 
 
 
 
 
 
 
 
 
 
 
 
 
 
 
 
 
 
 
 
 
 
CC:                                                             [id number]
DICTATION DATE: 09/09/19 1600     :     09/09/19 1934      DIS IN  
                                                                      09/09/19
[facility]                                          
[address] [place] [address]

## 2019-09-11 NOTE — OP
PATIENT NAME:  LEONID HERNANDEZ                          MEDICAL RECORD: [id number]
:[birth year]                                             LOCATION:D.M2     D.2130
                                                         ADMISSION DATE:19
SURGEON:  CLARISSE ESPINOSA MD             
 
 
DATE OF OPERATION:  2019
 
PROCEDURES:
1.  PTCA and stent to the left circumflex.
2.  Left heart catheterization.
3.  Selective coronary angiography.
4.  Left ventriculogram.
 
INDICATIONS:  Unstable angina and coronary artery disease.
 
PROCEDURE IN DETAIL:  After informed consent was obtained and after a detailed
description of the risks, benefits as well as alternative therapies, the patient
elected to proceed with angiogram and angioplasty.  The right radial area was
prepped and draped in normal sterile fashion.  Right radial artery was
cannulated via modified Seldinger technique with placement of 6-Occitan sheath. 
All catheters exchanged through this sheath.
 
FINDINGS:  The left ventriculogram was performed in standard 30-degree MONSON view,
reveals good cardiac wall motion throughout all segments.  Overall ejection
fraction estimated 60%.
 
SELECTIVE CORONARY ANGIOGRAPHY:
1.  Left main is with no significant angiographic disease.
2.  Left anterior descending has moderate irregularities but no flow-limiting
stenosis.
3.  The left circumflex has 80% to 85% stenosis in the mid vessel.
4.  Right coronary has moderate irregularities but no flow-limiting stenosis.
 
PTCA AND STENT OF THE LEFT CIRCUMFLEX:  The stent used was a 3.0 x 18 mm Cobra. 
Result was 0% residual stenosis.
 
OVERALL IMPRESSION:  Successful percutaneous transluminal angioplasty stent of
the left circumflex going from 80% to 85% initial stenosis to 0% residual.
 
TRANSINT:[id number] Voice Confirmation ID: [id number] DOCUMENT ID: [id number]
                                           
                                           CLARISSE ESPINOSA MD             
 
 
 
Electronically Signed by CLARISSE ESPINOSA on 19 at 1432
 
 
 
CC:                                                             [id number]
DICTATION DATE: 19 1618     :     19 2107      DIS IN  
                                                                      [address] [id number] [place] [id number]

## 2019-09-11 NOTE — ST
PATIENT:LEONID HERNANDEZ                          MEDICAL RECORD: [id number]
SEX: F                                                   LOCATION:[address]
ORDER #:                                                 ADMISSION DATE: 09/09/19
AGE OF PATIENT: 49            
 
REFERRING PHYSICIAN:                                                             
 
INTERPRETING PHYSICIAN: CLARISSE ESPINOSA MD             

 
 
DATE OF SERVICE:  09/09/2019
 
Exercise Stress Test
 
INDICATIONS:  Chest pain.
 
She was exercised 7 minutes 30 seconds achieving greater than 85% max target
heart rate response with 2 mm ST depression at peak stress reproducibility of
symptomatology with arm pain and jaw pain and chest pain.
 
OVERALL IMPRESSION:  Positive for inducible ischemia.
 
TRANSINT:[id number] Voice Confirmation ID: [id number] DOCUMENT ID: [id number]
 
 
                                           
                                           CLARISSE ESPINOSA MD             
 
 
 
Electronically Signed by CLARISSE ESPINOSA on 09/11/19 at 1432
 
 
 
 
 
 
 
 
 
 
 
 
 
 
 
 
 
 
CC:                                                             [id number]
DICTATION DATE: 09/09/19 1516     :     09/10/19 0429      DIS IN  
                                                                      09/09/19
[id number] [place] [address]

## 2019-11-14 ENCOUNTER — HOSPITAL ENCOUNTER (OUTPATIENT)
Dept: HOSPITAL 84 - D.LAB | Age: 49
Discharge: HOME | End: 2019-11-14
Attending: INTERNAL MEDICINE
Payer: MEDICAID

## 2019-11-14 VITALS — BODY MASS INDEX: 19.7 KG/M2

## 2019-11-14 DIAGNOSIS — J45.909: Primary | ICD-10-CM

## 2019-11-14 LAB
ERYTHROCYTE [DISTWIDTH] IN BLOOD BY AUTOMATED COUNT: 11.7 % (ref 11.5–14.5)
HCT VFR BLD CALC: 42.8 % (ref 36–48)
HGB BLD-MCNC: 15.6 G/DL (ref 12–16)
LYMPHOCYTES NFR BLD AUTO: 33.4 % (ref 15–50)
MCH RBC QN AUTO: 32.4 PG (ref 26–34)
MCHC RBC AUTO-ENTMCNC: 36.4 G/DL (ref 31–37)
MCV RBC: 89 FL (ref 80–100)
NEUTROPHILS NFR BLD AUTO: 59.2 % (ref 40–80)
PLATELET # BLD: 251 10X3/UL (ref 130–400)
PMV BLD AUTO: 9 FL (ref 7.4–10.4)
RBC # BLD AUTO: 4.81 10X6/UL (ref 4–5.4)
WBC # BLD AUTO: 9.5 10X3/UL (ref 4.8–10.8)

## 2020-01-16 ENCOUNTER — HOSPITAL ENCOUNTER (EMERGENCY)
Dept: HOSPITAL 84 - D.ER | Age: 50
Discharge: HOME | End: 2020-01-16
Payer: COMMERCIAL

## 2020-01-16 VITALS — BODY MASS INDEX: 20.13 KG/M2 | WEIGHT: 125.26 LBS | HEIGHT: 66 IN

## 2020-01-16 VITALS — SYSTOLIC BLOOD PRESSURE: 115 MMHG | DIASTOLIC BLOOD PRESSURE: 65 MMHG

## 2020-01-16 DIAGNOSIS — N93.9: ICD-10-CM

## 2020-01-16 DIAGNOSIS — E11.65: ICD-10-CM

## 2020-01-16 DIAGNOSIS — R10.9: Primary | ICD-10-CM

## 2020-01-16 DIAGNOSIS — Z79.4: ICD-10-CM

## 2020-01-16 LAB
ALBUMIN SERPL-MCNC: 3.8 G/DL (ref 3.4–5)
ALP SERPL-CCNC: 48 U/L (ref 46–116)
ALT SERPL-CCNC: 23 U/L (ref 10–68)
ANION GAP SERPL CALC-SCNC: 12.5 MMOL/L (ref 8–16)
APPEARANCE UR: CLEAR
BACTERIA #/AREA URNS HPF: (no result) /HPF
BASOPHILS NFR BLD AUTO: 0.6 % (ref 0–2)
BILIRUB SERPL-MCNC: 1.2 MG/DL (ref 0.2–1.3)
BILIRUB SERPL-MCNC: NEGATIVE MG/DL
BUN SERPL-MCNC: 13 MG/DL (ref 7–18)
CALCIUM SERPL-MCNC: 8.6 MG/DL (ref 8.5–10.1)
CHLORIDE SERPL-SCNC: 101 MMOL/L (ref 98–107)
CO2 SERPL-SCNC: 29.8 MMOL/L (ref 21–32)
COLOR UR: YELLOW
CREAT SERPL-MCNC: 0.7 MG/DL (ref 0.6–1.3)
EOSINOPHIL NFR BLD: 0.9 % (ref 0–7)
ERYTHROCYTE [DISTWIDTH] IN BLOOD BY AUTOMATED COUNT: 11.7 % (ref 11.5–14.5)
GLOBULIN SER-MCNC: 3 G/L
GLUCOSE SERPL-MCNC: 1000 MG/DL
GLUCOSE SERPL-MCNC: 319 MG/DL (ref 74–106)
HCG UR QL: NEGATIVE
HCT VFR BLD CALC: 45.2 % (ref 36–48)
HGB BLD-MCNC: 16.6 G/DL (ref 12–16)
IMM GRANULOCYTES NFR BLD: 0.3 % (ref 0–5)
KETONES UR STRIP-MCNC: (no result) MG/DL
LYMPHOCYTES NFR BLD AUTO: 30.8 % (ref 15–50)
MCH RBC QN AUTO: 32.9 PG (ref 26–34)
MCHC RBC AUTO-ENTMCNC: 36.7 G/DL (ref 31–37)
MCV RBC: 89.5 FL (ref 80–100)
MONOCYTES NFR BLD: 3.3 % (ref 2–11)
MUCOUS THREADS #/AREA URNS LPF: (no result) /LPF
NEUTROPHILS NFR BLD AUTO: 64.1 % (ref 40–80)
NITRITE UR-MCNC: NEGATIVE MG/ML
OSMOLALITY SERPL CALC.SUM OF ELEC: 289 MOSM/KG (ref 275–300)
PH UR STRIP: 5 [PH] (ref 5–6)
PLATELET # BLD: 243 10X3/UL (ref 130–400)
PMV BLD AUTO: 9.4 FL (ref 7.4–10.4)
POTASSIUM SERPL-SCNC: 4.3 MMOL/L (ref 3.5–5.1)
PROT SERPL-MCNC: 6.8 G/DL (ref 6.4–8.2)
PROT UR-MCNC: NEGATIVE MG/DL
RBC # BLD AUTO: 5.05 10X6/UL (ref 4–5.4)
RBC #/AREA URNS HPF: (no result) /HPF (ref 0–5)
SODIUM SERPL-SCNC: 139 MMOL/L (ref 136–145)
SP GR UR STRIP: 1.01 (ref 1–1.02)
SQUAMOUS #/AREA URNS HPF: (no result) /HPF (ref 0–5)
UROBILINOGEN UR-MCNC: NORMAL MG/DL
WBC # BLD AUTO: 9.3 10X3/UL (ref 4.8–10.8)
WBC #/AREA URNS HPF: (no result) /HPF

## 2020-05-25 ENCOUNTER — HOSPITAL ENCOUNTER (EMERGENCY)
Dept: HOSPITAL 84 - D.ER | Age: 50
Discharge: HOME | End: 2020-05-25
Payer: COMMERCIAL

## 2020-05-25 VITALS — SYSTOLIC BLOOD PRESSURE: 109 MMHG | DIASTOLIC BLOOD PRESSURE: 76 MMHG

## 2020-05-25 VITALS — BODY MASS INDEX: 18.52 KG/M2 | WEIGHT: 115.24 LBS | HEIGHT: 66 IN

## 2020-05-25 DIAGNOSIS — E11.9: ICD-10-CM

## 2020-05-25 DIAGNOSIS — J45.909: ICD-10-CM

## 2020-05-25 DIAGNOSIS — H66.92: Primary | ICD-10-CM

## 2020-05-25 DIAGNOSIS — Z72.0: ICD-10-CM

## 2020-05-25 DIAGNOSIS — Z79.4: ICD-10-CM

## 2020-09-12 ENCOUNTER — HOSPITAL ENCOUNTER (EMERGENCY)
Dept: HOSPITAL 84 - D.ER | Age: 50
Discharge: HOME | End: 2020-09-12
Payer: COMMERCIAL

## 2020-09-12 VITALS — HEIGHT: 66 IN | BODY MASS INDEX: 18.52 KG/M2 | WEIGHT: 115.24 LBS

## 2020-09-12 VITALS — DIASTOLIC BLOOD PRESSURE: 64 MMHG | SYSTOLIC BLOOD PRESSURE: 132 MMHG

## 2020-09-12 DIAGNOSIS — E11.9: ICD-10-CM

## 2020-09-12 DIAGNOSIS — L73.2: Primary | ICD-10-CM

## 2020-09-12 DIAGNOSIS — Z79.4: ICD-10-CM

## 2020-09-12 DIAGNOSIS — L02.411: ICD-10-CM

## 2020-09-12 DIAGNOSIS — J45.909: ICD-10-CM
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
